# Patient Record
Sex: MALE | Race: WHITE | HISPANIC OR LATINO | URBAN - METROPOLITAN AREA
[De-identification: names, ages, dates, MRNs, and addresses within clinical notes are randomized per-mention and may not be internally consistent; named-entity substitution may affect disease eponyms.]

---

## 2018-04-02 ENCOUNTER — APPOINTMENT (RX ONLY)
Dept: URBAN - METROPOLITAN AREA CLINIC 86 | Facility: CLINIC | Age: 48
Setting detail: DERMATOLOGY
End: 2018-04-02

## 2018-04-02 VITALS
SYSTOLIC BLOOD PRESSURE: 135 MMHG | HEART RATE: 69 BPM | DIASTOLIC BLOOD PRESSURE: 85 MMHG | WEIGHT: 185 LBS | HEIGHT: 68 IN

## 2018-04-02 DIAGNOSIS — L28.0 LICHEN SIMPLEX CHRONICUS: ICD-10-CM

## 2018-04-02 PROCEDURE — ? COUNSELING

## 2018-04-02 PROCEDURE — 11900 INJECT SKIN LESIONS </W 7: CPT

## 2018-04-02 PROCEDURE — ? INTRALESIONAL KENALOG

## 2018-04-02 PROCEDURE — ? TREATMENT REGIMEN

## 2018-04-02 PROCEDURE — 99202 OFFICE O/P NEW SF 15 MIN: CPT | Mod: 25

## 2018-04-02 ASSESSMENT — LOCATION SIMPLE DESCRIPTION DERM: LOCATION SIMPLE: LEFT CHEEK

## 2018-04-02 ASSESSMENT — LOCATION ZONE DERM: LOCATION ZONE: FACE

## 2018-04-02 ASSESSMENT — LOCATION DETAILED DESCRIPTION DERM: LOCATION DETAILED: LEFT SUPERIOR CENTRAL MALAR CHEEK

## 2018-04-02 NOTE — PROCEDURE: INTRALESIONAL KENALOG
Detail Level: Simple
Concentration Of Solution Injected (Mg/Ml): 2.5
Lot # (Optional): [id number]
Consent: The risks of atrophy were reviewed with the patient.
Expiration Date (Optional): 11/2018
Total Volume Injected (Ccs- Only Use Numbers And Decimals): 0.5
Administered By (Optional): Tamika Mora
X Size Of Lesion In Cm (Optional): 0
Include Z78.9 (Other Specified Conditions Influencing Health Status) As An Associated Diagnosis?: No
Kenalog Preparation: Kenalog
Medical Necessity Clause: This procedure was medically necessary because the lesions that were treated were:

## 2019-07-31 ENCOUNTER — APPOINTMENT (RX ONLY)
Dept: URBAN - METROPOLITAN AREA CLINIC 86 | Facility: CLINIC | Age: 49
Setting detail: DERMATOLOGY
End: 2019-07-31

## 2019-07-31 VITALS — HEIGHT: 68 IN | WEIGHT: 180 LBS

## 2019-07-31 DIAGNOSIS — L28.0 LICHEN SIMPLEX CHRONICUS: ICD-10-CM

## 2019-07-31 PROCEDURE — 11900 INJECT SKIN LESIONS </W 7: CPT

## 2019-07-31 PROCEDURE — ? INTRALESIONAL KENALOG

## 2019-07-31 ASSESSMENT — LOCATION ZONE DERM: LOCATION ZONE: FACE

## 2019-07-31 ASSESSMENT — LOCATION SIMPLE DESCRIPTION DERM: LOCATION SIMPLE: LEFT CHEEK

## 2019-07-31 ASSESSMENT — LOCATION DETAILED DESCRIPTION DERM: LOCATION DETAILED: LEFT LATERAL MALAR CHEEK

## 2019-07-31 NOTE — PROCEDURE: MIPS QUALITY
Quality 111:Pneumonia Vaccination Status For Older Adults: Pneumococcal Vaccination Previously Received
Quality 131: Pain Assessment And Follow-Up: Pain assessment documented as positive using a standardized tool AND a follow-up plan is documented
Detail Level: Detailed
Quality 130: Documentation Of Current Medications In The Medical Record: Current Medications Documented
Quality 110: Preventive Care And Screening: Influenza Immunization: Influenza Immunization Administered during Influenza season
Quality 474: Zoster Vaccination Status: Shingrix Vaccination Administered or Previously Received

## 2019-07-31 NOTE — PROCEDURE: INTRALESIONAL KENALOG
Administered By (Optional): Dr. Silvia Calljeas
Detail Level: Detailed
Concentration Of Solution Injected (Mg/Ml): 5.0
Include Z78.9 (Other Specified Conditions Influencing Health Status) As An Associated Diagnosis?: No
Ndc# For Kenalog Only: 4422-1851-89
Consent: The risks of atrophy were reviewed with the patient.
Medical Necessity Clause: This procedure was medically necessary because the lesions that were treated were:
Kenalog Preparation: Kenalog
Total Volume Injected (Ccs- Only Use Numbers And Decimals): 0.4
X Size Of Lesion In Cm (Optional): 0

## 2019-08-28 ENCOUNTER — APPOINTMENT (RX ONLY)
Dept: URBAN - METROPOLITAN AREA CLINIC 86 | Facility: CLINIC | Age: 49
Setting detail: DERMATOLOGY
End: 2019-08-28

## 2019-08-28 VITALS — WEIGHT: 168 LBS | HEIGHT: 68 IN

## 2019-08-28 DIAGNOSIS — L28.0 LICHEN SIMPLEX CHRONICUS: ICD-10-CM | Status: RESOLVED

## 2019-08-28 PROCEDURE — 99212 OFFICE O/P EST SF 10 MIN: CPT

## 2019-08-28 PROCEDURE — ? COUNSELING

## 2021-10-26 ENCOUNTER — APPOINTMENT (RX ONLY)
Dept: URBAN - METROPOLITAN AREA CLINIC 86 | Facility: CLINIC | Age: 51
Setting detail: DERMATOLOGY
End: 2021-10-26

## 2021-10-26 VITALS — WEIGHT: 200 LBS | HEIGHT: 68 IN

## 2021-10-26 DIAGNOSIS — L82.0 INFLAMED SEBORRHEIC KERATOSIS: ICD-10-CM

## 2021-10-26 DIAGNOSIS — L90.5 SCAR CONDITIONS AND FIBROSIS OF SKIN: ICD-10-CM

## 2021-10-26 PROCEDURE — ? LIQUID NITROGEN

## 2021-10-26 PROCEDURE — ? COUNSELING

## 2021-10-26 PROCEDURE — 99212 OFFICE O/P EST SF 10 MIN: CPT | Mod: 25

## 2021-10-26 PROCEDURE — 17110 DESTRUCTION B9 LES UP TO 14: CPT

## 2021-10-26 ASSESSMENT — LOCATION SIMPLE DESCRIPTION DERM
LOCATION SIMPLE: RIGHT CHEEK
LOCATION SIMPLE: LEFT CHEEK

## 2021-10-26 ASSESSMENT — LOCATION ZONE DERM: LOCATION ZONE: FACE

## 2021-10-26 ASSESSMENT — LOCATION DETAILED DESCRIPTION DERM
LOCATION DETAILED: RIGHT SUPERIOR LATERAL MALAR CHEEK
LOCATION DETAILED: LEFT SUPERIOR LATERAL MALAR CHEEK

## 2021-10-26 NOTE — PROCEDURE: LIQUID NITROGEN
Consent: The patient's consent was obtained including but not limited to risks of crusting, scabbing, blistering, scarring, darker or lighter pigmentary change, recurrence, incomplete removal and infection.
Include Z78.9 (Other Specified Conditions Influencing Health Status) As An Associated Diagnosis?: No
Post-Care Instructions: I reviewed with the patient in detail post-care instructions. Patient is to wear sunprotection, and avoid picking at any of the treated lesions. Pt may apply Vaseline to crusted or scabbing areas.
Detail Level: Detailed
Show Applicator Variable?: Yes
Medical Necessity Information: It is in your best interest to select a reason for this procedure from the list below. All of these items fulfill various CMS LCD requirements except the new and changing color options.
Medical Necessity Clause: This procedure was medically necessary because the lesions that were treated were:

## 2021-12-06 ENCOUNTER — APPOINTMENT (RX ONLY)
Dept: URBAN - METROPOLITAN AREA CLINIC 86 | Facility: CLINIC | Age: 51
Setting detail: DERMATOLOGY
End: 2021-12-06

## 2021-12-06 VITALS — WEIGHT: 200 LBS | HEIGHT: 68 IN

## 2021-12-06 DIAGNOSIS — L81.0 POSTINFLAMMATORY HYPERPIGMENTATION: ICD-10-CM

## 2021-12-06 PROCEDURE — ? COUNSELING

## 2021-12-06 PROCEDURE — ? TREATMENT REGIMEN

## 2021-12-06 PROCEDURE — 99212 OFFICE O/P EST SF 10 MIN: CPT

## 2021-12-06 ASSESSMENT — LOCATION ZONE DERM: LOCATION ZONE: FACE

## 2021-12-06 ASSESSMENT — LOCATION DETAILED DESCRIPTION DERM: LOCATION DETAILED: RIGHT SUPERIOR CENTRAL MALAR CHEEK

## 2021-12-06 ASSESSMENT — LOCATION SIMPLE DESCRIPTION DERM: LOCATION SIMPLE: RIGHT CHEEK

## 2021-12-06 NOTE — PROCEDURE: MIPS QUALITY
Detail Level: Detailed
Quality 130: Documentation Of Current Medications In The Medical Record: Documentation of a medical reason(s) for not documenting, updating, or reviewing the patientâs current medications list (e.g., patient is in an urgent or emergent medical situation)
Additional Notes: No medications at this time.